# Patient Record
Sex: FEMALE | Race: WHITE | NOT HISPANIC OR LATINO
[De-identification: names, ages, dates, MRNs, and addresses within clinical notes are randomized per-mention and may not be internally consistent; named-entity substitution may affect disease eponyms.]

---

## 2018-06-25 PROBLEM — Z00.00 ENCOUNTER FOR PREVENTIVE HEALTH EXAMINATION: Status: ACTIVE | Noted: 2018-06-25

## 2018-07-11 ENCOUNTER — APPOINTMENT (OUTPATIENT)
Dept: SURGERY | Facility: CLINIC | Age: 33
End: 2018-07-11

## 2018-07-11 ENCOUNTER — OUTPATIENT (OUTPATIENT)
Dept: OUTPATIENT SERVICES | Facility: HOSPITAL | Age: 33
LOS: 1 days | End: 2018-07-11
Payer: COMMERCIAL

## 2018-07-11 VITALS
OXYGEN SATURATION: 97 % | HEART RATE: 90 BPM | DIASTOLIC BLOOD PRESSURE: 79 MMHG | TEMPERATURE: 99 F | WEIGHT: 169.76 LBS | SYSTOLIC BLOOD PRESSURE: 106 MMHG | RESPIRATION RATE: 16 BRPM

## 2018-07-11 DIAGNOSIS — Z98.890 OTHER SPECIFIED POSTPROCEDURAL STATES: Chronic | ICD-10-CM

## 2018-07-11 DIAGNOSIS — Z01.818 ENCOUNTER FOR OTHER PREPROCEDURAL EXAMINATION: ICD-10-CM

## 2018-07-11 DIAGNOSIS — N80.9 ENDOMETRIOSIS, UNSPECIFIED: ICD-10-CM

## 2018-07-11 DIAGNOSIS — K08.409 PARTIAL LOSS OF TEETH, UNSPECIFIED CAUSE, UNSPECIFIED CLASS: Chronic | ICD-10-CM

## 2018-07-11 LAB
ALBUMIN SERPL ELPH-MCNC: 3.9 G/DL — SIGNIFICANT CHANGE UP (ref 3.3–5)
ALP SERPL-CCNC: 44 U/L — SIGNIFICANT CHANGE UP (ref 40–120)
ALT FLD-CCNC: 17 U/L — SIGNIFICANT CHANGE UP (ref 10–45)
ANION GAP SERPL CALC-SCNC: 14 MMOL/L — SIGNIFICANT CHANGE UP (ref 5–17)
APPEARANCE UR: CLEAR — SIGNIFICANT CHANGE UP
APTT BLD: 29.5 SEC — SIGNIFICANT CHANGE UP (ref 27.5–37.4)
AST SERPL-CCNC: 22 U/L — SIGNIFICANT CHANGE UP (ref 10–40)
BACTERIA # UR AUTO: PRESENT /HPF
BILIRUB SERPL-MCNC: 0.6 MG/DL — SIGNIFICANT CHANGE UP (ref 0.2–1.2)
BILIRUB UR-MCNC: NEGATIVE — SIGNIFICANT CHANGE UP
BLD GP AB SCN SERPL QL: NEGATIVE — SIGNIFICANT CHANGE UP
BUN SERPL-MCNC: 17 MG/DL — SIGNIFICANT CHANGE UP (ref 7–23)
CALCIUM SERPL-MCNC: 9.1 MG/DL — SIGNIFICANT CHANGE UP (ref 8.4–10.5)
CHLORIDE SERPL-SCNC: 103 MMOL/L — SIGNIFICANT CHANGE UP (ref 96–108)
CO2 SERPL-SCNC: 23 MMOL/L — SIGNIFICANT CHANGE UP (ref 22–31)
COLOR SPEC: YELLOW — SIGNIFICANT CHANGE UP
CREAT SERPL-MCNC: 0.7 MG/DL — SIGNIFICANT CHANGE UP (ref 0.5–1.3)
DIFF PNL FLD: ABNORMAL
EPI CELLS # UR: SIGNIFICANT CHANGE UP /HPF (ref 0–5)
GLUCOSE SERPL-MCNC: 87 MG/DL — SIGNIFICANT CHANGE UP (ref 70–99)
GLUCOSE UR QL: NEGATIVE — SIGNIFICANT CHANGE UP
HCG SERPL-ACNC: <.1 MIU/ML — SIGNIFICANT CHANGE UP
HCT VFR BLD CALC: 38.6 % — SIGNIFICANT CHANGE UP (ref 34.5–45)
HGB BLD-MCNC: 12.8 G/DL — SIGNIFICANT CHANGE UP (ref 11.5–15.5)
INR BLD: 0.91 — SIGNIFICANT CHANGE UP (ref 0.88–1.16)
KETONES UR-MCNC: NEGATIVE — SIGNIFICANT CHANGE UP
LEUKOCYTE ESTERASE UR-ACNC: ABNORMAL
MCHC RBC-ENTMCNC: 30.3 PG — SIGNIFICANT CHANGE UP (ref 27–34)
MCHC RBC-ENTMCNC: 33.2 G/DL — SIGNIFICANT CHANGE UP (ref 32–36)
MCV RBC AUTO: 91.5 FL — SIGNIFICANT CHANGE UP (ref 80–100)
NITRITE UR-MCNC: NEGATIVE — SIGNIFICANT CHANGE UP
PH UR: 5.5 — SIGNIFICANT CHANGE UP (ref 5–8)
PLATELET # BLD AUTO: 212 K/UL — SIGNIFICANT CHANGE UP (ref 150–400)
POTASSIUM SERPL-MCNC: 4.3 MMOL/L — SIGNIFICANT CHANGE UP (ref 3.5–5.3)
POTASSIUM SERPL-SCNC: 4.3 MMOL/L — SIGNIFICANT CHANGE UP (ref 3.5–5.3)
PROT SERPL-MCNC: 6.3 G/DL — SIGNIFICANT CHANGE UP (ref 6–8.3)
PROT UR-MCNC: NEGATIVE MG/DL — SIGNIFICANT CHANGE UP
PROTHROM AB SERPL-ACNC: 10.1 SEC — SIGNIFICANT CHANGE UP (ref 9.8–12.7)
RBC # BLD: 4.22 M/UL — SIGNIFICANT CHANGE UP (ref 3.8–5.2)
RBC # FLD: 12.9 % — SIGNIFICANT CHANGE UP (ref 10.3–16.9)
RBC CASTS # UR COMP ASSIST: < 5 /HPF — SIGNIFICANT CHANGE UP
RH IG SCN BLD-IMP: POSITIVE — SIGNIFICANT CHANGE UP
SODIUM SERPL-SCNC: 140 MMOL/L — SIGNIFICANT CHANGE UP (ref 135–145)
SP GR SPEC: <=1.005 — SIGNIFICANT CHANGE UP (ref 1–1.03)
UROBILINOGEN FLD QL: 0.2 E.U./DL — SIGNIFICANT CHANGE UP
WBC # BLD: 6.9 K/UL — SIGNIFICANT CHANGE UP (ref 3.8–10.5)
WBC # FLD AUTO: 6.9 K/UL — SIGNIFICANT CHANGE UP (ref 3.8–10.5)
WBC UR QL: ABNORMAL /HPF

## 2018-07-11 PROCEDURE — 93010 ELECTROCARDIOGRAM REPORT: CPT

## 2018-07-11 NOTE — H&P PST ADULT - HISTORY OF PRESENT ILLNESS
Patient has been complaining of pelvic pain since last august. During laparoscopic hernia repair in April of 2018, endometriosis was diagnosed. Patient complains also of related low back pain and right leg pain.

## 2018-07-11 NOTE — H&P PST ADULT - ASSESSMENT
No medical contraindication to operative hystroscopy, laparoscopic excision of endometriosis and laparoscopic appendectomy, pending normal labs.

## 2018-07-11 NOTE — H&P PST ADULT - NSANTHOSAYNRD_GEN_A_CORE
No. KURT screening performed.  STOP BANG Legend: 0-2 = LOW Risk; 3-4 = INTERMEDIATE Risk; 5-8 = HIGH Risk

## 2018-07-11 NOTE — H&P PST ADULT - REASON FOR ADMISSION
Patient is to undergo laparoscopic excision of endometriosis , hysteroscopy, and laparoscopic appendectomy.

## 2018-07-11 NOTE — ASU PATIENT PROFILE, ADULT - PMH
Endometriosis    Environmental allergies    GERD (gastroesophageal reflux disease)    Gilbert syndrome    Hypoglycemia    IBS (irritable bowel syndrome)    Migraine

## 2018-07-11 NOTE — H&P PST ADULT - FAMILY HISTORY
Mother  Still living? Yes, Estimated age: Age Unknown  Type 2 diabetes mellitus, Age at diagnosis: Age Unknown  Hypercholesterolemia, Age at diagnosis: Age Unknown

## 2018-07-12 LAB — CANCER AG125 SERPL-ACNC: 14 U/ML — SIGNIFICANT CHANGE UP

## 2018-07-18 LAB — ANTI-MULLERIAN HORMONE: 3.76 NG/ML — SIGNIFICANT CHANGE UP

## 2018-07-26 VITALS
DIASTOLIC BLOOD PRESSURE: 54 MMHG | HEART RATE: 84 BPM | WEIGHT: 169.98 LBS | SYSTOLIC BLOOD PRESSURE: 95 MMHG | RESPIRATION RATE: 16 BRPM | TEMPERATURE: 97 F | HEIGHT: 69 IN | OXYGEN SATURATION: 99 %

## 2018-07-26 RX ORDER — LORATADINE 10 MG/1
1 TABLET ORAL
Qty: 0 | Refills: 0 | COMMUNITY

## 2018-07-26 NOTE — ASU PREOP CHECKLIST - SELECT TESTS ORDERED
CMP/Urinalysis/EKG/CBC/INR/PT/PTT/Type and Screen Type and Screen/EKG/CBC/CMP/Urinalysis/PT/PTT/INR/81

## 2018-07-27 ENCOUNTER — INPATIENT (INPATIENT)
Facility: HOSPITAL | Age: 33
LOS: 0 days | Discharge: HOME | DRG: 743 | End: 2018-07-28
Attending: OBSTETRICS & GYNECOLOGY | Admitting: OBSTETRICS & GYNECOLOGY
Payer: COMMERCIAL

## 2018-07-27 ENCOUNTER — RESULT REVIEW (OUTPATIENT)
Age: 33
End: 2018-07-27

## 2018-07-27 DIAGNOSIS — K08.409 PARTIAL LOSS OF TEETH, UNSPECIFIED CAUSE, UNSPECIFIED CLASS: Chronic | ICD-10-CM

## 2018-07-27 DIAGNOSIS — Z98.890 OTHER SPECIFIED POSTPROCEDURAL STATES: Chronic | ICD-10-CM

## 2018-07-27 LAB
GLUCOSE BLDC GLUCOMTR-MCNC: 117 MG/DL — HIGH (ref 70–99)
GLUCOSE BLDC GLUCOMTR-MCNC: 81 MG/DL — SIGNIFICANT CHANGE UP (ref 70–99)

## 2018-07-27 RX ORDER — SIMETHICONE 80 MG/1
80 TABLET, CHEWABLE ORAL THREE TIMES A DAY
Qty: 0 | Refills: 0 | Status: DISCONTINUED | OUTPATIENT
Start: 2018-07-27 | End: 2018-07-28

## 2018-07-27 RX ORDER — DIPHENHYDRAMINE HCL 50 MG
25 CAPSULE ORAL ONCE
Qty: 0 | Refills: 0 | Status: DISCONTINUED | OUTPATIENT
Start: 2018-07-27 | End: 2018-07-27

## 2018-07-27 RX ORDER — HYDROMORPHONE HYDROCHLORIDE 2 MG/ML
0.5 INJECTION INTRAMUSCULAR; INTRAVENOUS; SUBCUTANEOUS
Qty: 0 | Refills: 0 | Status: DISCONTINUED | OUTPATIENT
Start: 2018-07-27 | End: 2018-07-28

## 2018-07-27 RX ORDER — ACETAMINOPHEN 500 MG
975 TABLET ORAL EVERY 6 HOURS
Qty: 0 | Refills: 0 | Status: DISCONTINUED | OUTPATIENT
Start: 2018-07-27 | End: 2018-07-28

## 2018-07-27 RX ORDER — DIPHENHYDRAMINE HCL 50 MG
50 CAPSULE ORAL EVERY 12 HOURS
Qty: 0 | Refills: 0 | Status: DISCONTINUED | OUTPATIENT
Start: 2018-07-27 | End: 2018-07-28

## 2018-07-27 RX ORDER — DOCUSATE SODIUM 100 MG
100 CAPSULE ORAL THREE TIMES A DAY
Qty: 0 | Refills: 0 | Status: DISCONTINUED | OUTPATIENT
Start: 2018-07-27 | End: 2018-07-28

## 2018-07-27 RX ORDER — IBUPROFEN 200 MG
1 TABLET ORAL
Qty: 0 | Refills: 0 | COMMUNITY

## 2018-07-27 RX ORDER — ONDANSETRON 8 MG/1
4 TABLET, FILM COATED ORAL EVERY 6 HOURS
Qty: 0 | Refills: 0 | Status: DISCONTINUED | OUTPATIENT
Start: 2018-07-27 | End: 2018-07-28

## 2018-07-27 RX ORDER — KETOROLAC TROMETHAMINE 30 MG/ML
30 SYRINGE (ML) INJECTION EVERY 6 HOURS
Qty: 0 | Refills: 0 | Status: DISCONTINUED | OUTPATIENT
Start: 2018-07-27 | End: 2018-07-28

## 2018-07-27 RX ORDER — SODIUM CHLORIDE 9 MG/ML
1000 INJECTION, SOLUTION INTRAVENOUS
Qty: 0 | Refills: 0 | Status: DISCONTINUED | OUTPATIENT
Start: 2018-07-27 | End: 2018-07-28

## 2018-07-27 RX ORDER — TRAMADOL HYDROCHLORIDE 50 MG/1
50 TABLET ORAL EVERY 6 HOURS
Qty: 0 | Refills: 0 | Status: DISCONTINUED | OUTPATIENT
Start: 2018-07-27 | End: 2018-07-28

## 2018-07-27 RX ADMIN — Medication 30 MILLIGRAM(S): at 19:09

## 2018-07-27 RX ADMIN — Medication 975 MILLIGRAM(S): at 20:13

## 2018-07-27 RX ADMIN — HYDROMORPHONE HYDROCHLORIDE 0.5 MILLIGRAM(S): 2 INJECTION INTRAMUSCULAR; INTRAVENOUS; SUBCUTANEOUS at 19:06

## 2018-07-27 RX ADMIN — SODIUM CHLORIDE 125 MILLILITER(S): 9 INJECTION, SOLUTION INTRAVENOUS at 18:57

## 2018-07-27 RX ADMIN — SIMETHICONE 80 MILLIGRAM(S): 80 TABLET, CHEWABLE ORAL at 22:14

## 2018-07-27 RX ADMIN — HYDROMORPHONE HYDROCHLORIDE 0.5 MILLIGRAM(S): 2 INJECTION INTRAMUSCULAR; INTRAVENOUS; SUBCUTANEOUS at 19:10

## 2018-07-27 RX ADMIN — Medication 30 MILLIGRAM(S): at 23:16

## 2018-07-27 RX ADMIN — TRAMADOL HYDROCHLORIDE 50 MILLIGRAM(S): 50 TABLET ORAL at 23:46

## 2018-07-27 RX ADMIN — Medication 30 MILLIGRAM(S): at 18:40

## 2018-07-27 RX ADMIN — Medication 975 MILLIGRAM(S): at 21:15

## 2018-07-27 RX ADMIN — HYDROMORPHONE HYDROCHLORIDE 0.5 MILLIGRAM(S): 2 INJECTION INTRAMUSCULAR; INTRAVENOUS; SUBCUTANEOUS at 18:51

## 2018-07-27 RX ADMIN — HYDROMORPHONE HYDROCHLORIDE 0.5 MILLIGRAM(S): 2 INJECTION INTRAMUSCULAR; INTRAVENOUS; SUBCUTANEOUS at 19:32

## 2018-07-27 RX ADMIN — ONDANSETRON 4 MILLIGRAM(S): 8 TABLET, FILM COATED ORAL at 23:16

## 2018-07-27 RX ADMIN — TRAMADOL HYDROCHLORIDE 50 MILLIGRAM(S): 50 TABLET ORAL at 23:16

## 2018-07-27 RX ADMIN — ONDANSETRON 4 MILLIGRAM(S): 8 TABLET, FILM COATED ORAL at 23:17

## 2018-07-27 RX ADMIN — Medication 102 MILLIGRAM(S): at 20:05

## 2018-07-27 RX ADMIN — Medication 30 MILLIGRAM(S): at 23:40

## 2018-07-27 NOTE — H&P ADULT - HISTORY OF PRESENT ILLNESS
32yo P0 LMP 7/11 presenting for endometriosis excision.  She reports pelvic pain, pain down leg, lower back pain, bloating and urinary frequency.     OBGYNHx: P0  PMH: none  PSH: L/S inguinal hernia repair with mesh 4/2018  Meds: loratadine, ranitidine  Allergies: ceclor - rash, eryuthromycin - rash, macrobid, ciptro, sulfa, sara, blue dye - rash

## 2018-07-27 NOTE — PROGRESS NOTE ADULT - ASSESSMENT
33y Female POD# 0 s/p L/S endo excision, hysteroscopy D&C                                       1. Neuro/Pain:  tylenol and toradol scheduled  2  CV:   VS per routine  3. Pulm: Encourage ISS  4. GI: NPO + Sips, Advance in the AM; AM BMP  5. :  Burgos in place, TOV in AM  6. Heme: AM CBC  7. ID: --  8. DVT ppx: SCDs  9. Dispo: Likely POD#1

## 2018-07-27 NOTE — H&P ADULT - ASSESSMENT
34yo P0 presenting for L/S endometriosis excision.  - patient NPO  - starting hgb 12.8  - dispo pending intraoperative course

## 2018-07-27 NOTE — PROGRESS NOTE ADULT - SUBJECTIVE AND OBJECTIVE BOX
Delay in note due to patient care.    GYN POC 22:15    Patient seen at bedside.  Pain controlled, currently 5/10. Tolerating sips.  No OOB yet.  Burgos in place.    Denies CP, palpitations, SOB, fever, chills, nausea, vomiting.    Vital Signs Last 24 Hrs  T(C): 36.7 (27 Jul 2018 21:47), Max: 36.9 (27 Jul 2018 20:36)  T(F): 98.1 (27 Jul 2018 21:47), Max: 98.5 (27 Jul 2018 20:36)  HR: 82 (27 Jul 2018 21:47) (58 - 88)  BP: 103/69 (27 Jul 2018 21:47) (99/59 - 109/62)  BP(mean): 74 (27 Jul 2018 20:36) (73 - 80)  RR: 16 (27 Jul 2018 21:47) (16 - 28)  SpO2: 100% (27 Jul 2018 21:47) (98% - 100%)    Physical Exam:  Gen: No Acute Distress  Pulm: Clear to auscultation bilaterally  GI: soft, appropriately tender, mildly distended, decreased BS, no rebound, no guarding.  Dressing C/D/I; no hematoma over right ovarian suspension  : Burgos in place  Ext: SCDs in place, wnl    I&O's Summary    27 Jul 2018 07:01  -  27 Jul 2018 23:32  --------------------------------------------------------  IN: 500 mL / OUT: 225 mL / NET: 275 mL      MEDICATIONS  (STANDING):  acetaminophen   Tablet. 975 milliGRAM(s) Oral every 6 hours  ketorolac   Injectable 30 milliGRAM(s) IV Push every 6 hours  lactated ringers. 1000 milliLiter(s) (125 mL/Hr) IV Continuous <Continuous>  ondansetron Injectable 4 milliGRAM(s) IV Push every 6 hours  simethicone 80 milliGRAM(s) Chew three times a day    MEDICATIONS  (PRN):  diphenhydrAMINE  IVPB 50 milliGRAM(s) IV Intermittent every 12 hours PRN Rash and/or Itching  docusate sodium 100 milliGRAM(s) Oral three times a day PRN Stool Softening  HYDROmorphone  Injectable 0.5 milliGRAM(s) IV Push every 10 minutes PRN Severe Pain (7 - 10)  traMADol 50 milliGRAM(s) Oral every 6 hours PRN Severe Pain (7 - 10)    Allergies    Ceclor (Rash)  Cipro (Rash)  erythromycin (Rash)  Macrobid (Rash)  sara (Rash)  penicillin (Rash)  sulfa drugs (Rash)  textile dye (Rash)    Intolerances        LABS:

## 2018-07-28 ENCOUNTER — TRANSCRIPTION ENCOUNTER (OUTPATIENT)
Age: 33
End: 2018-07-28

## 2018-07-28 VITALS
RESPIRATION RATE: 16 BRPM | SYSTOLIC BLOOD PRESSURE: 9 MMHG | OXYGEN SATURATION: 98 % | HEART RATE: 70 BPM | DIASTOLIC BLOOD PRESSURE: 103 MMHG | TEMPERATURE: 98 F

## 2018-07-28 LAB
ANION GAP SERPL CALC-SCNC: 8 MMOL/L — SIGNIFICANT CHANGE UP (ref 5–17)
BASOPHILS NFR BLD AUTO: 0.2 % — SIGNIFICANT CHANGE UP (ref 0–2)
BUN SERPL-MCNC: 8 MG/DL — SIGNIFICANT CHANGE UP (ref 7–23)
CALCIUM SERPL-MCNC: 8.5 MG/DL — SIGNIFICANT CHANGE UP (ref 8.4–10.5)
CHLORIDE SERPL-SCNC: 107 MMOL/L — SIGNIFICANT CHANGE UP (ref 96–108)
CO2 SERPL-SCNC: 26 MMOL/L — SIGNIFICANT CHANGE UP (ref 22–31)
CREAT SERPL-MCNC: 0.79 MG/DL — SIGNIFICANT CHANGE UP (ref 0.5–1.3)
EOSINOPHIL NFR BLD AUTO: 0.2 % — SIGNIFICANT CHANGE UP (ref 0–6)
GLUCOSE SERPL-MCNC: 102 MG/DL — HIGH (ref 70–99)
HCT VFR BLD CALC: 35.7 % — SIGNIFICANT CHANGE UP (ref 34.5–45)
HGB BLD-MCNC: 11.5 G/DL — SIGNIFICANT CHANGE UP (ref 11.5–15.5)
LYMPHOCYTES # BLD AUTO: 25.2 % — SIGNIFICANT CHANGE UP (ref 13–44)
MCHC RBC-ENTMCNC: 30.5 PG — SIGNIFICANT CHANGE UP (ref 27–34)
MCHC RBC-ENTMCNC: 32.2 G/DL — SIGNIFICANT CHANGE UP (ref 32–36)
MCV RBC AUTO: 94.7 FL — SIGNIFICANT CHANGE UP (ref 80–100)
MONOCYTES NFR BLD AUTO: 10.7 % — SIGNIFICANT CHANGE UP (ref 2–14)
NEUTROPHILS NFR BLD AUTO: 63.7 % — SIGNIFICANT CHANGE UP (ref 43–77)
PLATELET # BLD AUTO: 237 K/UL — SIGNIFICANT CHANGE UP (ref 150–400)
POTASSIUM SERPL-MCNC: 4.2 MMOL/L — SIGNIFICANT CHANGE UP (ref 3.5–5.3)
POTASSIUM SERPL-SCNC: 4.2 MMOL/L — SIGNIFICANT CHANGE UP (ref 3.5–5.3)
RBC # BLD: 3.77 M/UL — LOW (ref 3.8–5.2)
RBC # FLD: 12.6 % — SIGNIFICANT CHANGE UP (ref 10.3–16.9)
SODIUM SERPL-SCNC: 141 MMOL/L — SIGNIFICANT CHANGE UP (ref 135–145)
WBC # BLD: 6.5 K/UL — SIGNIFICANT CHANGE UP (ref 3.8–10.5)
WBC # FLD AUTO: 6.5 K/UL — SIGNIFICANT CHANGE UP (ref 3.8–10.5)

## 2018-07-28 RX ORDER — DIPHENHYDRAMINE HCL 50 MG
25 CAPSULE ORAL EVERY 4 HOURS
Qty: 0 | Refills: 0 | Status: DISCONTINUED | OUTPATIENT
Start: 2018-07-28 | End: 2018-07-28

## 2018-07-28 RX ORDER — HYDROCORTISONE 1 %
1 OINTMENT (GRAM) TOPICAL DAILY
Qty: 0 | Refills: 0 | Status: DISCONTINUED | OUTPATIENT
Start: 2018-07-28 | End: 2018-07-28

## 2018-07-28 RX ORDER — DROSPIRENONE AND ETHINYL ESTRADIOL 0.03MG-3MG
1 KIT ORAL
Qty: 0 | Refills: 0 | COMMUNITY

## 2018-07-28 RX ORDER — CLINDAMYCIN PHOSPHATE GEL USP, 1% 10 MG/G
1 GEL TOPICAL
Qty: 0 | Refills: 0 | COMMUNITY

## 2018-07-28 RX ORDER — ACETAMINOPHEN 500 MG
2 TABLET ORAL
Qty: 0 | Refills: 0 | COMMUNITY

## 2018-07-28 RX ORDER — ZINC OXIDE 200 MG/G
1 OINTMENT TOPICAL
Qty: 0 | Refills: 0 | COMMUNITY
Start: 2018-07-28

## 2018-07-28 RX ORDER — ZINC OXIDE 200 MG/G
1 OINTMENT TOPICAL DAILY
Qty: 0 | Refills: 0 | Status: DISCONTINUED | OUTPATIENT
Start: 2018-07-28 | End: 2018-07-28

## 2018-07-28 RX ORDER — ADAPALENE 3 MG/G
1 GEL TOPICAL
Qty: 0 | Refills: 0 | COMMUNITY

## 2018-07-28 RX ORDER — PANTOPRAZOLE SODIUM 20 MG/1
40 TABLET, DELAYED RELEASE ORAL
Qty: 0 | Refills: 0 | Status: DISCONTINUED | OUTPATIENT
Start: 2018-07-28 | End: 2018-07-28

## 2018-07-28 RX ORDER — RANITIDINE HYDROCHLORIDE 150 MG/1
0 TABLET, FILM COATED ORAL
Qty: 0 | Refills: 0 | COMMUNITY

## 2018-07-28 RX ORDER — LORATADINE 10 MG/1
1 TABLET ORAL
Qty: 0 | Refills: 0 | COMMUNITY

## 2018-07-28 RX ORDER — TRAMADOL HYDROCHLORIDE 50 MG/1
1 TABLET ORAL
Qty: 0 | Refills: 0 | COMMUNITY
Start: 2018-07-28

## 2018-07-28 RX ORDER — OMEPRAZOLE 10 MG/1
1 CAPSULE, DELAYED RELEASE ORAL
Qty: 0 | Refills: 0 | COMMUNITY

## 2018-07-28 RX ORDER — HYDROCORTISONE 1 %
1 OINTMENT (GRAM) TOPICAL
Qty: 0 | Refills: 0 | COMMUNITY
Start: 2018-07-28

## 2018-07-28 RX ORDER — SUMATRIPTAN SUCCINATE 4 MG/.5ML
1 INJECTION, SOLUTION SUBCUTANEOUS
Qty: 0 | Refills: 0 | COMMUNITY

## 2018-07-28 RX ORDER — SUMATRIPTAN SUCCINATE AND NAPROXEN SODIUM 85; 500 MG/1; MG/1
1 TABLET, FILM COATED ORAL
Qty: 0 | Refills: 0 | COMMUNITY

## 2018-07-28 RX ADMIN — Medication 975 MILLIGRAM(S): at 04:41

## 2018-07-28 RX ADMIN — TRAMADOL HYDROCHLORIDE 50 MILLIGRAM(S): 50 TABLET ORAL at 05:26

## 2018-07-28 RX ADMIN — Medication 975 MILLIGRAM(S): at 11:48

## 2018-07-28 RX ADMIN — Medication 30 MILLIGRAM(S): at 11:48

## 2018-07-28 RX ADMIN — Medication 30 MILLIGRAM(S): at 12:10

## 2018-07-28 RX ADMIN — ZINC OXIDE 1 APPLICATION(S): 200 OINTMENT TOPICAL at 14:42

## 2018-07-28 RX ADMIN — Medication 975 MILLIGRAM(S): at 12:10

## 2018-07-28 RX ADMIN — ONDANSETRON 4 MILLIGRAM(S): 8 TABLET, FILM COATED ORAL at 04:41

## 2018-07-28 RX ADMIN — SIMETHICONE 80 MILLIGRAM(S): 80 TABLET, CHEWABLE ORAL at 11:48

## 2018-07-28 RX ADMIN — Medication 30 MILLIGRAM(S): at 05:00

## 2018-07-28 RX ADMIN — SIMETHICONE 80 MILLIGRAM(S): 80 TABLET, CHEWABLE ORAL at 04:41

## 2018-07-28 RX ADMIN — Medication 100 MILLIGRAM(S): at 04:41

## 2018-07-28 RX ADMIN — Medication 975 MILLIGRAM(S): at 05:15

## 2018-07-28 RX ADMIN — ONDANSETRON 4 MILLIGRAM(S): 8 TABLET, FILM COATED ORAL at 11:48

## 2018-07-28 RX ADMIN — TRAMADOL HYDROCHLORIDE 50 MILLIGRAM(S): 50 TABLET ORAL at 06:00

## 2018-07-28 RX ADMIN — Medication 30 MILLIGRAM(S): at 04:41

## 2018-07-28 RX ADMIN — Medication 1 APPLICATION(S): at 14:42

## 2018-07-28 RX ADMIN — PANTOPRAZOLE SODIUM 40 MILLIGRAM(S): 20 TABLET, DELAYED RELEASE ORAL at 11:48

## 2018-07-28 NOTE — DISCHARGE NOTE ADULT - HOSPITAL COURSE
Patient had a laparoscopic endometriosis excision and dilation and curettage and hysteroscopy. Post-operative course was uncomplicated. Pain well controlled.

## 2018-07-28 NOTE — DISCHARGE NOTE ADULT - MEDICATION SUMMARY - MEDICATIONS TO TAKE
I will START or STAY ON the medications listed below when I get home from the hospital:    ibuprofen 400 mg oral tablet  -- 1 tab(s) by mouth every 6 hours, As Needed  -- Indication: For Pain    traMADol 50 mg oral tablet  -- 1 tab(s) by mouth every 6 hours, As needed, Severe Pain (7 - 10)  -- Indication: For Pain    hydrocortisone 2.5% topical cream  -- 1 application on skin once a day  -- Indication: For Irritation Skin    zinc oxide 20% topical ointment  -- 1 application on skin once a day  -- Indication: For Irritation Skin

## 2018-07-28 NOTE — DISCHARGE NOTE ADULT - PLAN OF CARE
return to baseline postoperative state Take Motrin 600mg every 6 hours and/or tylenol 650mg every 6 hours as needed for pain. To schedule a follow up appointment in 1-2weeks. Keep incision site clean and dry. Call if you experiences severe abdominal pain not improved by oral pain medications or  fever greater than 100.4F.

## 2018-07-28 NOTE — DISCHARGE NOTE ADULT - CARE PLAN
Principal Discharge DX:	Endometriosis  Goal:	return to baseline postoperative state  Assessment and plan of treatment:	Take Motrin 600mg every 6 hours and/or tylenol 650mg every 6 hours as needed for pain. To schedule a follow up appointment in 1-2weeks. Keep incision site clean and dry. Call if you experiences severe abdominal pain not improved by oral pain medications or  fever greater than 100.4F.

## 2018-07-28 NOTE — PROGRESS NOTE ADULT - ASSESSMENT
33y Female POD# 1 s/p l/s endo excision, hysteroscopy D&C                                        1. Neuro/Pain:  OPM, toradol  2  CV:   VS per routine  3. Pulm: Encourage ISS  4. GI: advance to regular  5. :  d/c goldsmith after removal of suspensions  6. Heme: Stable  7. ID: --  8. DVT ppx: SCDs  9. Dispo: Likely POD#1

## 2018-07-28 NOTE — PROGRESS NOTE ADULT - SUBJECTIVE AND OBJECTIVE BOX
GYN Progress Note    Patient seen at bedside.  Pain controlled on tylenol, toradol, and tramadol  Tolerating clears  Not OOB 2/2 ovarian suspensions  Burgos with good output    Denies CP, palpitations, SOB, fever, chills, nausea, vomiting.    Vital Signs Last 24 Hrs  T(C): 36.8 (28 Jul 2018 04:49), Max: 36.9 (27 Jul 2018 20:36)  T(F): 98.3 (28 Jul 2018 04:49), Max: 98.5 (27 Jul 2018 20:36)  HR: 72 (28 Jul 2018 04:49) (58 - 88)  BP: 101/63 (28 Jul 2018 04:49) (98/66 - 109/62)  BP(mean): 74 (27 Jul 2018 20:36) (73 - 80)  RR: 16 (28 Jul 2018 04:49) (15 - 28)  SpO2: 100% (28 Jul 2018 04:49) (98% - 100%)    Physical Exam:  Gen: No Acute Distress  Pulm: Normal work of breathing  GI: soft, appropriately tender, nondistended, +BS, no rebound, no guarding.  Incision C/D/I  Ext: SCDs in place, [facility]    I&O's Summary    27 Jul 2018 07:01  -  28 Jul 2018 07:00  --------------------------------------------------------  IN: 500 mL / OUT: 225 mL / NET: 275 mL    28 Jul 2018 07:01  -  28 Jul 2018 07:58  --------------------------------------------------------  IN: 0 mL / OUT: 1200 mL / NET: -1200 mL      MEDICATIONS  (STANDING):  acetaminophen   Tablet. 975 milliGRAM(s) Oral every 6 hours  ketorolac   Injectable 30 milliGRAM(s) IV Push every 6 hours  lactated ringers. 1000 milliLiter(s) (125 mL/Hr) IV Continuous <Continuous>  ondansetron Injectable 4 milliGRAM(s) IV Push every 6 hours  simethicone 80 milliGRAM(s) Chew three times a day    MEDICATIONS  (PRN):  diphenhydrAMINE  IVPB 50 milliGRAM(s) IV Intermittent every 12 hours PRN Rash and/or Itching  docusate sodium 100 milliGRAM(s) Oral three times a day PRN Stool Softening  HYDROmorphone  Injectable 0.5 milliGRAM(s) IV Push every 10 minutes PRN Severe Pain (7 - 10)  traMADol 50 milliGRAM(s) Oral every 6 hours PRN Severe Pain (7 - 10)      LABS:

## 2018-07-28 NOTE — DISCHARGE NOTE ADULT - PATIENT PORTAL LINK FT
You can access the [facility] Patient Portal, offered by [facility], by registering with the following website: [email or web address]

## 2018-07-31 PROCEDURE — 82962 GLUCOSE BLOOD TEST: CPT

## 2018-07-31 PROCEDURE — 88305 TISSUE EXAM BY PATHOLOGIST: CPT

## 2018-07-31 PROCEDURE — 88302 TISSUE EXAM BY PATHOLOGIST: CPT

## 2018-07-31 PROCEDURE — 85025 COMPLETE CBC W/AUTO DIFF WBC: CPT

## 2018-07-31 PROCEDURE — 80048 BASIC METABOLIC PNL TOTAL CA: CPT

## 2018-07-31 PROCEDURE — 36415 COLL VENOUS BLD VENIPUNCTURE: CPT

## 2018-08-02 DIAGNOSIS — N80.8 OTHER ENDOMETRIOSIS: ICD-10-CM

## 2018-08-02 DIAGNOSIS — N80.4 ENDOMETRIOSIS OF RECTOVAGINAL SEPTUM AND VAGINA: ICD-10-CM

## 2018-08-02 DIAGNOSIS — N80.5 ENDOMETRIOSIS OF INTESTINE: ICD-10-CM

## 2018-08-02 DIAGNOSIS — N87.9 DYSPLASIA OF CERVIX UTERI, UNSPECIFIED: ICD-10-CM

## 2018-08-02 DIAGNOSIS — N80.0 ENDOMETRIOSIS OF UTERUS: ICD-10-CM

## 2018-08-02 DIAGNOSIS — K58.9 IRRITABLE BOWEL SYNDROME WITHOUT DIARRHEA: ICD-10-CM

## 2018-08-02 DIAGNOSIS — N94.10 UNSPECIFIED DYSPAREUNIA: ICD-10-CM

## 2018-08-02 DIAGNOSIS — N80.3 ENDOMETRIOSIS OF PELVIC PERITONEUM: ICD-10-CM

## 2018-08-02 LAB — SURGICAL PATHOLOGY STUDY: SIGNIFICANT CHANGE UP

## 2022-01-24 NOTE — ASU PREOP CHECKLIST - ISOLATION PRECAUTIONS
PT CALLED TO [facility] . THEY SAID COULDN'T REF. WAS WRONG? CALLED VALERIE AND SHE IS CALLING SLEEP TO SEE WHY CAN'T SHE WILL CALL PT BACK    none

## 2024-04-02 NOTE — DISCHARGE NOTE ADULT - CARE PROVIDER_API CALL
Danae Cruz), Obstetrics and Gynecology  [address] [place] [address]  Phone: [phone]  Fax: [phone]
[phone]